# Patient Record
Sex: FEMALE | Race: WHITE | NOT HISPANIC OR LATINO | URBAN - METROPOLITAN AREA
[De-identification: names, ages, dates, MRNs, and addresses within clinical notes are randomized per-mention and may not be internally consistent; named-entity substitution may affect disease eponyms.]

---

## 2017-05-03 ENCOUNTER — OUTPATIENT (OUTPATIENT)
Dept: OUTPATIENT SERVICES | Facility: HOSPITAL | Age: 52
LOS: 1 days | Discharge: HOME | End: 2017-05-03

## 2017-06-28 DIAGNOSIS — F19.20 OTHER PSYCHOACTIVE SUBSTANCE DEPENDENCE, UNCOMPLICATED: ICD-10-CM

## 2018-10-30 ENCOUNTER — OUTPATIENT (OUTPATIENT)
Dept: OUTPATIENT SERVICES | Facility: HOSPITAL | Age: 53
LOS: 1 days | Discharge: HOME | End: 2018-10-30

## 2018-10-30 DIAGNOSIS — Z12.31 ENCOUNTER FOR SCREENING MAMMOGRAM FOR MALIGNANT NEOPLASM OF BREAST: ICD-10-CM

## 2019-01-17 ENCOUNTER — EMERGENCY (EMERGENCY)
Facility: HOSPITAL | Age: 54
LOS: 0 days | Discharge: HOME | End: 2019-01-17
Admitting: PHYSICIAN ASSISTANT

## 2019-01-17 VITALS
HEART RATE: 98 BPM | TEMPERATURE: 98 F | RESPIRATION RATE: 18 BRPM | DIASTOLIC BLOOD PRESSURE: 60 MMHG | SYSTOLIC BLOOD PRESSURE: 110 MMHG | OXYGEN SATURATION: 98 %
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
## 2019-01-17 DIAGNOSIS — M25.461 EFFUSION, RIGHT KNEE: ICD-10-CM

## 2019-01-17 DIAGNOSIS — M25.561 PAIN IN RIGHT KNEE: ICD-10-CM
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
## 2019-01-17 NOTE — ED PROVIDER NOTE - OBJECTIVE STATEMENT
53 year old female w no significant pmhx presents to the ED with right knee pain since yesterday. Patient was walking when she twisted her knee and felt and heard a loud pop. States she has been able to walk but with a limp. Symptoms are mildly relieved with Ibuprofen and icing the knee. Denies falling to the ground or hitting her head. Denies previous injury or surgeries to her knee. Denies numbness, paresthesias, or weakness to her lower extremity. No recent illness.

## 2019-01-17 NOTE — ED PROVIDER NOTE - NSFOLLOWUPINSTRUCTIONS_ED_ALL_ED_FT
[facility]  Address: [address] [place] [address]  Phone: [phone]      Strain    A strain is a stretch or tear in one of the muscles in your body. This is caused by an injury to the area such as a twisting mechanism. Symptoms include pain, swelling, or bruising. Rest that area over the next several days and slowly resume activity when tolerated. Ice can help with swelling and pain.     SEEK IMMEDIATE MEDICAL CARE IF YOU HAVE ANY OF THE FOLLOWING SYMPTOMS: worsening pain, inability to move that body part, numbness or tingling.      Sprain    A sprain is a stretch or tear in one of the tough, fiber-like tissues (ligaments) in your body. This is caused by an injury to the area such as a twisting mechanism. Symptoms include pain, swelling, or bruising. Rest that area over the next several days and slowly resume activity when tolerated. Ice can help with swelling and pain.     SEEK IMMEDIATE MEDICAL CARE IF YOU HAVE ANY OF THE FOLLOWING SYMPTOMS: worsening pain, inability to move that body part, numbness or tingling.

## 2019-01-17 NOTE — ED PROVIDER NOTE - CARE PROVIDER_API CALL
Johan Ford (MD), Orthopaedic Surgery  [facility] [place] [address]  Phone: [phone]  Fax: [phone]

## 2019-01-17 NOTE — ED PROVIDER NOTE - PHYSICAL EXAMINATION
VITALS:  I have reviewed the initial vital signs.  GENERAL: Well-developed, well-nourished, in no acute distress.  HEENT: Sclera clear. Mucous membranes moist.  CARDIOVASCULAR: RRR, nl S1 and S2. No murmurs, rubs, or gallops. 2+ DP pulses b/l.   RESPIRATORY: Normal effort. CTA b/l without wheezes, rales, or rhonchi.  MSK: +swelling and ttp inferior to right patella, overlying the patellar tendon. decreased ROM w extension of the knee, intact flexion. No deformity. No medial or lateral joint line tenderness. No joint laxity. No patellar ttp. Popliteal fossa, calf, and ankle nontender and w/o swelling.   INTEGUMENTARY: Warm, dry. No pallor or rashes. Capillary refill <2 seconds.  NEURO: A&Ox3. Speech clear. Sensation to lower extremities intact and equal throughout.

## 2019-01-17 NOTE — ED PROVIDER NOTE - PROGRESS NOTE DETAILS
patient seen and evaluated by me. knee xrays ordered. patient denies pain medications at this time. discussed xray results w patient, placed in knee immobilizer and bird dressing (see procedure note). will give f/u with ortho. patient understands return precautions.

## 2019-01-17 NOTE — ED PROVIDER NOTE - NS ED ROS FT
CONSTITUTIONAL: (-) fevers, (-) chills  ENT: (-) congestion, (-) rhinorrhea, (-) sore throat  NECK: (-) neck pain, (-) neck stiffness  RESPIRATORY: (-) cough, (-) sputum  GI: (-) nausea, (-) vomiting  MSK: see HPI (+) right knee pain and swelling, (-) ankle pain, (-) back pain  INTEGUMENTARY: (-) rashes, (-) wounds, (-) pallor, (-) ecchymosis  NEURO: (-) paresthesias, (-) numbness, (-) weakness

## 2019-06-28 ENCOUNTER — EMERGENCY (EMERGENCY)
Facility: HOSPITAL | Age: 54
LOS: 0 days | Discharge: HOME | End: 2019-06-28
Admitting: STUDENT IN AN ORGANIZED HEALTH CARE EDUCATION/TRAINING PROGRAM
Payer: MEDICARE

## 2019-06-28 VITALS
DIASTOLIC BLOOD PRESSURE: 92 MMHG | HEART RATE: 107 BPM | RESPIRATION RATE: 18 BRPM | SYSTOLIC BLOOD PRESSURE: 138 MMHG | OXYGEN SATURATION: 98 % | TEMPERATURE: 98 F

## 2019-06-28 DIAGNOSIS — M79.89 OTHER SPECIFIED SOFT TISSUE DISORDERS: ICD-10-CM

## 2019-06-28 DIAGNOSIS — M79.661 PAIN IN RIGHT LOWER LEG: ICD-10-CM

## 2019-06-28 DIAGNOSIS — M25.569 PAIN IN UNSPECIFIED KNEE: ICD-10-CM

## 2019-06-28 DIAGNOSIS — F17.200 NICOTINE DEPENDENCE, UNSPECIFIED, UNCOMPLICATED: ICD-10-CM

## 2019-06-28 PROCEDURE — 93970 EXTREMITY STUDY: CPT | Mod: 26

## 2019-06-28 PROCEDURE — 99284 EMERGENCY DEPT VISIT MOD MDM: CPT

## 2019-06-28 NOTE — ED PROVIDER NOTE - OBJECTIVE STATEMENT
54y F pmh bakers cysts presents for evaluation of R leg swelling. Pt states she has been having mild/moderate aching R LE pain, worse with ambulations, relieved with compression and elevation. Associated swelling of R LE. Pt states she has had swelling in the past related to her R sided bakers cyst but it has not been painful in the past. Denies weakness, numbness, sob, cp, lightheadness 54y F pmh bakers cysts presents for evaluation of R leg swelling. Pt states she has been having mild/moderate aching R LE pain, worse with ambulations, relieved with compression and elevation. Associated swelling of R LE. Pt states she has had swelling in the past related to her R sided bakers cyst but it has not been painful in the past. Denies weakness, numbness, sob, cp, lightheadedness

## 2019-06-28 NOTE — ED PROVIDER NOTE - CARE PROVIDER_API CALL
Walt Santos)  Surgery; Vascular Surgery  [address] [place] [address]  Phone: [phone]  Fax: [phone]  Follow Up Time:

## 2019-06-28 NOTE — ED PROVIDER NOTE - NS ED ROS FT
Constitutional: (-) fever  Eyes/ENT: (-) blurry vision, (-) epistaxis  Cardiovascular: (-) chest pain, (-) syncope  Respiratory: (-) cough, (-) shortness of breath  Gastrointestinal: (-) vomiting, (-) diarrhea  : (-) dysuria, (-) hematuria  Musculoskeletal: (-) neck pain, (-) back pain, (-) joint pain  Integumentary: (+) edema, (-) rash  Neurological: (-) headache, (-) altered mental status  Allergic/Immunologic: (-) pruritus

## 2019-06-28 NOTE — ED ADULT NURSE NOTE - NSIMPLEMENTINTERV_GEN_ALL_ED
Implemented All Universal Safety Interventions:  [place] to call system. Call bell, personal items and telephone within reach. Instruct patient to call for assistance. Room bathroom lighting operational. Non-slip footwear when patient is off stretcher. Physically safe environment: no spills, clutter or unnecessary equipment. Stretcher in lowest position, wheels locked, appropriate side rails in place.

## 2019-06-28 NOTE — ED PROVIDER NOTE - PHYSICAL EXAMINATION
CONST: Well appearing in NAD  CARD: Normal S1 S2; Normal rate and rhythm  RESP: Equal BS B/L, No wheezes, rhonchi or rales. No distress  GI: Soft, non-tender, non-distended. no cva tenderness. normal BS  MS: Normal ROM in all extremities. No midline spinal tenderness. pulses 2 +. mild right calf swelling  SKIN: Warm, dry, no acute rashes. Good turgor  NEURO: Strength 5/5 with no sensory deficits. Steady gait.

## 2019-06-28 NOTE — ED PROVIDER NOTE - NSFOLLOWUPINSTRUCTIONS_ED_ALL_ED_FT
Follow up with vascular surgery and PMD in 1-2 days.    WHAT YOU NEED TO KNOW:    Leg edema is swelling caused by fluid buildup. Your legs may swell if you sit or stand for long periods of time, are pregnant, or are injured. Swelling may also occur if you have heart failure or circulation problems. This means that your heart does not pump blood through your body as it should.    DISCHARGE INSTRUCTIONS:    Self-care:     Elevate your legs: Raise your legs above the level of your heart as often as you can. This will help decrease swelling and pain. Prop your legs on pillows or blankets to keep them elevated comfortably.      Wear pressure stockings: These tight stockings put pressure on your legs to promote blood flow and prevent blood clots. Wear the stockings during the day. Do not wear them while you sleep.      Apply heat: Heat helps decrease pain and swelling. Apply heat on the area for 20 to 30 minutes every 2 hours for as many days as directed.       Stay active: Do not stand or sit for long periods of time. Ask your healthcare provider about the best exercise plan for you.      Eat healthy foods: Healthy foods include fruits, vegetables, whole-grain breads, low-fat dairy products, beans, lean meats, and fish. Ask if you need to be on a special diet. Limit salt. Salt will make your body hold even more fluid.    Follow up with your healthcare provider as directed: Write down your questions so you remember to ask them during your visits.     Contact your healthcare provider if:     You have a fever or feel more tired than usual.      The veins in your legs look larger than usual. They may look full or bulging.      Your legs itch or feel heavy.      You have red or white areas or sores on your legs. The skin may also appear dimpled or have indentations.      You are gaining weight.      You have trouble moving your ankles.      The swelling does not go away, or other parts of your body swell.      You have questions or concerns about your condition or care.    Return to the emergency department if:     You cannot walk.      You feel faint or confused.       Your skin turns blue or gray.      Your leg feels warm, tender, and painful. It may be swollen and red.      You have chest pain or trouble breathing that is worse when you lie down.      You suddenly feel lightheaded and have trouble breathing.      You have new and sudden chest pain. You may have more pain when you take deep breaths or cough. You may also cough up blood.

## 2019-07-01 ENCOUNTER — INBOUND DOCUMENT (OUTPATIENT)
Age: 54
End: 2019-07-01

## 2019-07-01 PROBLEM — Z00.00 ENCOUNTER FOR PREVENTIVE HEALTH EXAMINATION: Status: ACTIVE | Noted: 2019-07-01

## 2019-08-06 ENCOUNTER — APPOINTMENT (OUTPATIENT)
Dept: PLASTIC SURGERY | Facility: CLINIC | Age: 54
End: 2019-08-06

## 2019-12-24 ENCOUNTER — OUTPATIENT (OUTPATIENT)
Dept: OUTPATIENT SERVICES | Facility: HOSPITAL | Age: 54
LOS: 1 days | Discharge: HOME | End: 2019-12-24
Payer: MEDICARE

## 2019-12-24 DIAGNOSIS — N64.4 MASTODYNIA: ICD-10-CM

## 2019-12-24 DIAGNOSIS — Z98.82 BREAST IMPLANT STATUS: ICD-10-CM

## 2019-12-24 PROCEDURE — 77066 DX MAMMO INCL CAD BI: CPT | Mod: 26

## 2019-12-24 PROCEDURE — 76641 ULTRASOUND BREAST COMPLETE: CPT | Mod: 26,50

## 2019-12-24 PROCEDURE — G0279: CPT | Mod: 26

## 2020-09-23 ENCOUNTER — OUTPATIENT (OUTPATIENT)
Dept: OUTPATIENT SERVICES | Facility: HOSPITAL | Age: 55
LOS: 1 days | Discharge: HOME | End: 2020-09-23
Payer: MEDICARE

## 2020-09-23 DIAGNOSIS — R07.89 OTHER CHEST PAIN: ICD-10-CM

## 2020-09-23 PROCEDURE — 78452 HT MUSCLE IMAGE SPECT MULT: CPT | Mod: 26

## 2021-05-04 ENCOUNTER — OUTPATIENT (OUTPATIENT)
Dept: OUTPATIENT SERVICES | Facility: HOSPITAL | Age: 56
LOS: 1 days | Discharge: HOME | End: 2021-05-04
Payer: MEDICARE

## 2021-05-04 DIAGNOSIS — N63.20 UNSPECIFIED LUMP IN THE LEFT BREAST, UNSPECIFIED QUADRANT: ICD-10-CM

## 2021-05-04 PROCEDURE — 77067 SCR MAMMO BI INCL CAD: CPT | Mod: 26

## 2021-05-04 PROCEDURE — 77063 BREAST TOMOSYNTHESIS BI: CPT | Mod: 26

## 2021-05-04 PROCEDURE — 76641 ULTRASOUND BREAST COMPLETE: CPT | Mod: 26,50

## 2021-08-27 ENCOUNTER — EMERGENCY (EMERGENCY)
Facility: HOSPITAL | Age: 56
LOS: 0 days | Discharge: HOME | End: 2021-08-27
Attending: EMERGENCY MEDICINE | Admitting: EMERGENCY MEDICINE
Payer: MEDICARE

## 2021-08-27 VITALS
HEART RATE: 73 BPM | SYSTOLIC BLOOD PRESSURE: 145 MMHG | DIASTOLIC BLOOD PRESSURE: 67 MMHG | WEIGHT: 169.98 LBS | OXYGEN SATURATION: 99 % | TEMPERATURE: 99 F | RESPIRATION RATE: 18 BRPM

## 2021-08-27 DIAGNOSIS — R22.0 LOCALIZED SWELLING, MASS AND LUMP, HEAD: ICD-10-CM

## 2021-08-27 DIAGNOSIS — K08.89 OTHER SPECIFIED DISORDERS OF TEETH AND SUPPORTING STRUCTURES: ICD-10-CM

## 2021-08-27 PROCEDURE — 99283 EMERGENCY DEPT VISIT LOW MDM: CPT

## 2021-08-27 RX ORDER — KETOROLAC TROMETHAMINE 30 MG/ML
30 SYRINGE (ML) INJECTION ONCE
Refills: 0 | Status: DISCONTINUED | OUTPATIENT
Start: 2021-08-27 | End: 2021-08-27

## 2021-08-27 RX ORDER — KETOROLAC TROMETHAMINE 30 MG/ML
15 SYRINGE (ML) INJECTION ONCE
Refills: 0 | Status: DISCONTINUED | OUTPATIENT
Start: 2021-08-27 | End: 2021-08-27

## 2021-08-27 RX ORDER — KETOROLAC TROMETHAMINE 30 MG/ML
1 SYRINGE (ML) INJECTION
Qty: 7 | Refills: 0
Start: 2021-08-27 | End: 2021-09-02

## 2021-08-27 RX ADMIN — Medication 30 MILLIGRAM(S): at 16:29

## 2021-08-27 NOTE — ED PROVIDER NOTE - PHYSICAL EXAMINATION
Physical Exam    Vital Signs: I have reviewed the initial vital signs.  Constitutional: well-nourished, appears stated age, no acute distress  Eyes: Conjunctiva pink, Sclera clear, PERRLA, EOMI, no ptosis, no entrapment, no racoon eyes.  mouth: ttp alogn tooth 27, no hyperemia of gums ,visible abscess or drainage, no sublingual edemaor ttp, no sub mandibular edema or ttp, uvual midline without tonsillar exudates no areas of fluctuanceor induration.  Cardiovascular: S1 and S2, regular rate, regular rhythm, well-perfused extremities, radial pulses equal and 2+, calves nonttp, equal in size  Respiratory: unlabored respiratory effort, speaking in full sentences, handling oral secretions,     Musculoskeletal: supple neck, to tmj tenderness  Integumentary: warm, dry, no rashes, lacerations,  Neurologic: awake, alert,

## 2021-08-27 NOTE — ED PROVIDER NOTE - ATTENDING CONTRIBUTION TO CARE
57 yo F no pmh presents with dental pain. Patient states that she had a dental implant placed 1 year ago. Few weeks ago started to have pain and swelling to the right lower molar. Went to the dentist yesterday and was found to have a dental abscess. Was started on clindamycin 2 days ago but dose increased yesterday. no fevers. + facial swelling. no difficulty eating.     VITALS:  I have reviewed the initial vital signs.  GENERAL: Well-developed, well-nourished, in no acute distress. Nontoxic.  HEENT: MMM, tolerating oral secretions. No trismus. + tenderness to right lower molar, No abscess. No floor of mouth or submandibular swelling. No tongue elevation.  + right lower facial swelling.   NECK: supple w FROM.   PULM: Normal effort. No tachypnea or retractions. No stridor.   SKIN: Warm, dry.  NEURO: A&Ox3. Speech clear. CN II-XII intact. No focal deficits.

## 2021-08-27 NOTE — ED PROVIDER NOTE - NSFOLLOWUPINSTRUCTIONS_ED_ALL_ED_FT
Make an appointment with the dentist. Take your clinda as prescribed. Start your augmentin tomorrow. Return to the ER if your pain worsens or you develop fever or difficulty swallowing. Do not take additional motrin or advil while taking the toradol you were prescribed,     Dental Pain    Dental pain (toothache) may be caused by many things including tooth decay (cavities or caries), abscess or infection, injury, or the reason may be unknown. Your pain may only occur when you are chewing, are exposed to hot or cold temperature, are eating or drinking sugary foods or beverages, or your pain may be constant. If you were prescribed an antibiotic medicine, finish all of it even if you start to feel better. Rinsing your mouth with salt water or applying ice to the painful area of your face may help with the pain.    SEEK IMMEDIATE MEDICAL CARE IF YOU HAVE THE FOLLOWING SYMPTOMS: unable to open mouth, trouble breathing or swallowing, fever, or swelling of the face, neck or jaw.

## 2021-08-27 NOTE — ED PROVIDER NOTE - NS ED ROS FT
Constitutional: (-) fever (-) chills (-) (-) lightheadedness   Eyes/ENT: (-) blurry vision, (-) epistaxis (-) rhinorrhea (-) nasal congestion (+) dental pain  Cardiovascular: (-) chest pain, (-) syncope (-) palpitations   Respiratory: (-) cough, (-) shortness of breath (-) pleurisy   Gastrointestinal: (-) vomiting, (-) diarrhea (-) abdominal pain (-) nausea (-) anorexia  Musculoskeletal: (-) neck pain, (-) back pain, (-) joint pain (-) joint swelling (-) painful ROM  Integumentary: (-) rash, (-) edema (-) lacerations (-) pruritis   Neurological: (-) headache, (-) altered mental status (-) LOC (-) dizziness (-) paresthesias (-) gait abnormalities

## 2021-08-27 NOTE — ED ADULT TRIAGE NOTE - CHIEF COMPLAINT QUOTE
had dental work 3 weeks ago, , right lower tooth cap, became swollen and red, took clinda and got better, now worse again

## 2021-08-27 NOTE — ED PROVIDER NOTE - CLINICAL SUMMARY MEDICAL DECISION MAKING FREE TEXT BOX
Patient presents with dental pain. On clinda for 2 days, dose increased yesterday. no fevers. + facial swelling on exam. Tolerating secretions. new abx sent. Discharged with dental follow up and return precautions.

## 2021-08-27 NOTE — ED PROVIDER NOTE - NSFOLLOWUPCLINICS_GEN_ALL_ED_FT
[facility]  Dental  [address] [place] [id number]  Phone: [phone]  Fax:

## 2021-08-27 NOTE — ED PROVIDER NOTE - PROGRESS NOTE DETAILS
dc: vss, ttp along tooth 27. Continue abx and rx sent for augmentin will begin tomorrow. Monitor for fever , chills, n/v or other symptoms. F/u given to dental clinic.

## 2021-08-27 NOTE — ED PROVIDER NOTE - PATIENT PORTAL LINK FT
You can access the FollowMyHealth Patient Portal offered by [facility] by registering at the following website: [email or web address]. By joining [facility]’s FollowMyHealth portal, you will also be able to view your health information using other applications (apps) compatible with our system.

## 2021-08-27 NOTE — ED PROVIDER NOTE - OBJECTIVE STATEMENT
56 y.o. woman no pmh, psh of dental cap took # 27 1 year ago presenting to ER for 2 days of dental pain and slight tenderness to cheek. Is on two days of clindaymycin rx sent by dentist. Was seen in office 2 days ago, xrays showed small abscess. No fever , chills ,sor ehtroat, voice chanhges, difficulty swallowing or handlign orall secretions. no sob or cough, cp or palpitations, n/v, neck pain

## 2022-09-16 ENCOUNTER — OUTPATIENT (OUTPATIENT)
Dept: OUTPATIENT SERVICES | Facility: HOSPITAL | Age: 57
LOS: 1 days | Discharge: HOME | End: 2022-09-16

## 2022-09-16 DIAGNOSIS — R92.8 OTHER ABNORMAL AND INCONCLUSIVE FINDINGS ON DIAGNOSTIC IMAGING OF BREAST: ICD-10-CM

## 2022-09-16 PROCEDURE — 77066 DX MAMMO INCL CAD BI: CPT | Mod: 26

## 2022-09-16 PROCEDURE — 76642 ULTRASOUND BREAST LIMITED: CPT | Mod: 26,LT

## 2022-09-16 PROCEDURE — G0279: CPT | Mod: 26

## 2022-11-09 ENCOUNTER — APPOINTMENT (OUTPATIENT)
Dept: ORTHOPEDIC SURGERY | Facility: CLINIC | Age: 57
End: 2022-11-09

## 2023-02-23 ENCOUNTER — APPOINTMENT (OUTPATIENT)
Dept: ORTHOPEDIC SURGERY | Facility: CLINIC | Age: 58
End: 2023-02-23

## 2023-03-09 ENCOUNTER — APPOINTMENT (OUTPATIENT)
Dept: NEUROLOGY | Facility: CLINIC | Age: 58
End: 2023-03-09
Payer: MEDICARE

## 2023-03-09 VITALS
HEIGHT: 65 IN | SYSTOLIC BLOOD PRESSURE: 123 MMHG | WEIGHT: 160 LBS | BODY MASS INDEX: 26.66 KG/M2 | HEART RATE: 80 BPM | DIASTOLIC BLOOD PRESSURE: 80 MMHG

## 2023-03-09 DIAGNOSIS — F29 UNSPECIFIED PSYCHOSIS NOT DUE TO A SUBSTANCE OR KNOWN PHYSIOLOGICAL CONDITION: ICD-10-CM

## 2023-03-09 DIAGNOSIS — G43.709 CHRONIC MIGRAINE W/OUT AURA, NOT INTRACTABLE, W/OUT STATUS MIGRAINOSUS: ICD-10-CM

## 2023-03-09 PROCEDURE — 99204 OFFICE O/P NEW MOD 45 MIN: CPT

## 2023-03-14 PROBLEM — G43.709 CHRONIC MIGRAINE WITHOUT AURA: Status: ACTIVE | Noted: 2023-03-14

## 2023-03-14 PROBLEM — F29 PSYCHOSIS: Status: ACTIVE | Noted: 2023-03-09

## 2023-03-14 NOTE — HISTORY OF PRESENT ILLNESS
[FreeTextEntry1] : Ms. Barry is a 57 year old woman who comes in for Memory Issues. She had an episode of Psychosis in 2019. \par Patients mother had brain cancer and she wants to have A neurological work up done. Pain reports of constant chronic pain. Patient has broken sleep throughout the night. She follows up with a Psychiatrist. She denies snoring in her sleep. She has been checked for Sleep Apnea and indicated that she does not have Sleep Apnea. She went in for a half a pack of cigarettes a day. Her pain level is a 7 out of 10. Patient denies of having any pain. \par In 2020 she followed up with \par \par Normal Neurological Exam. - Indicates slight findings of Sleep Apnea.

## 2023-03-14 NOTE — ASSESSMENT
[FreeTextEntry1] : cognitive impairment/history of psychosis\par \par -will obtain previous done neuroimaging for review\par - Neuro Psychological Testing. \par - EEG. \par - Blood Work.\par \par \par chronic migraine headache\par -sumatriptan for abortive therapy\par \par \par \par \par \par I, Briana Gavin, Attest that this documentation has been prepared under the direction and in the presence of Provider Ronnie Mosher DO\par \par Thank You for letting me assist in the management of this patient. \par \par Ronnie Mosher DO\par Board Certified, Neurology\par  \par

## 2023-03-31 ENCOUNTER — APPOINTMENT (OUTPATIENT)
Dept: NEUROLOGY | Facility: CLINIC | Age: 58
End: 2023-03-31

## 2023-04-27 ENCOUNTER — APPOINTMENT (OUTPATIENT)
Dept: NEUROLOGY | Facility: CLINIC | Age: 58
End: 2023-04-27

## 2023-09-01 ENCOUNTER — APPOINTMENT (OUTPATIENT)
Dept: NEUROSURGERY | Facility: CLINIC | Age: 58
End: 2023-09-01
Payer: MEDICARE

## 2023-09-01 VITALS — HEIGHT: 65 IN | BODY MASS INDEX: 26.16 KG/M2 | WEIGHT: 157 LBS

## 2023-09-01 PROCEDURE — 99204 OFFICE O/P NEW MOD 45 MIN: CPT

## 2023-09-01 NOTE — END OF VISIT
[FreeTextEntry3] : I have independently evaluated and examined this patient, as well as independently reviewed her AP/lateral cervical x-ray and agree with above.  Ms. Barry does have degenerative changes at C4-5, the level above her C5-7 ACDF she will proceed with conservative measures.  Follow-up with me in 8 weeks time or sooner should she develop any new signs or symptoms of neurologic dysfunction.  All her questions were answered and she was in full agreement with the plan above.  Thank you for allowing us to participate in your care.  Sincerely,  All Valero MD Department of Neurosurgery [facility] [Time Spent: ___ minutes] : I have spent [unfilled] minutes of time on the encounter.

## 2023-09-01 NOTE — HISTORY OF PRESENT ILLNESS
[FreeTextEntry1] : neck pain  [de-identified] : This is a 58-year-old, RH, female past medical history of a C5-C7 ACDF done by Dr. Klein in 2015 for neck pain which resolved after the surgery presents today reporting of neck pain radiating to both shoulders worse on the left side since she fell down a burrow hole while her pitbull was tugging her, 3 months ago. Reports the pain radiates down the left elbow. Denies radicular right arm pain, numbness, tingling, weakness, bladder/bowel incontinence, and saddle anesthesia.  At this visit, Ms. Barry, does report of left facial numbness, tingling, and weakness. Reports her mother passed away at 62 yrs old from Glioblastoma. She is schedule to see neurology on September 13.  She has not done any conservative management.  PMH: psychosis (family hx  schizophrenia) under the care of a behavioral health provider  On physical examination:  She is awake alert oriented person place and time, her pupils equal round reactive to light, her extraocular muscle intact, her face is symmetric, speech is clear and fluent, her tongue is midline protrusion testing, her has full palate raise. Her is 5 out of 5 in bilateral shoulder shrug.  She Is 5 out of 5 in bilateral upper and bilateral lower extremities throughout  Sensation to light touch intact in all 4 extremities, no saddle anesthesia  No pronator drift  2+ bilateral bicep and patellar deep tendon reflexes  Negative Layne sign bilaterally  Negative ankle clonus bilaterally  Normal tandem nonantalgic gait  Pain is improved with lifting her left arm overhead and with light cervical traction.

## 2023-09-01 NOTE — PLAN
[FreeTextEntry1] : Ms. Barry , h/o  C5-6 ACDF in 2015, presents today for neck pain radiating to the shoulders, worse on the left side, which radiates down to the elbow, after falling in a burrow hole 3 months ago.  No neurological deficit noted on exam.  I am ordering an MRI of the cervical spine without contrast to assess for adjacent segment level disease.  Also ordering a CT of the cervical spine to assess the hardware and bone structures and a cervical flex/ex x-ray of the cervical spine to assess for alignment and instability.  In the meantime she will be referred to physical therapy for 6 to 8 weeks and pain management, for conservative measures.  She will follow-up with me in 8 to 10 weeks.  All questions were answered.  She is agreeable to the plan.

## 2023-09-07 ENCOUNTER — APPOINTMENT (OUTPATIENT)
Dept: PAIN MANAGEMENT | Facility: CLINIC | Age: 58
End: 2023-09-07

## 2023-09-08 ENCOUNTER — APPOINTMENT (OUTPATIENT)
Dept: PAIN MANAGEMENT | Facility: CLINIC | Age: 58
End: 2023-09-08
Payer: MEDICARE

## 2023-09-08 DIAGNOSIS — M25.519 PAIN IN UNSPECIFIED SHOULDER: ICD-10-CM

## 2023-09-08 DIAGNOSIS — M54.2 CERVICALGIA: ICD-10-CM

## 2023-09-08 PROCEDURE — 99203 OFFICE O/P NEW LOW 30 MIN: CPT | Mod: 25

## 2023-09-08 PROCEDURE — 20552 NJX 1/MLT TRIGGER POINT 1/2: CPT

## 2023-09-09 NOTE — PHYSICAL EXAM
[de-identified] : Cervical Spine Exam:  Inspection: erythema (-)  ecchymosis (-)  rashes (-)   Palpation:                                         Cervical paraspinal mm tenderness:   R (-); L(-) Upper trapezius mm tenderness:        R (-); L (-) Rhomboids tenderness:                       R (-); L (-) Scalenes mm tenderness:                   R (-); L (-) Occipital Ridge:                                    R (-); L (-) Supraspinatus mm tenderness:           R (-); L (-)  ROM:   limited rom 2/2 to pain  Strength Testing:            Right    Left Deltoid                             (5/5)    (5/5) Biceps:                            (5/5)    (5/5) Triceps:                           (5/5)    (5/5) Finger Abductors:           (5/5)    (5/5) Grasp:                             (5/5)    (5/5)  Special Testing: Spurling Test:                  R (-); L (-) Facet load test:               R (-); L (-)

## 2023-09-09 NOTE — PROCEDURE
[Trigger point 1-2 muscle groups] : trigger point 1-2 muscle groups [Left] : of the left [Cervical paraspinal muscle] : cervical paraspinal muscle [Thoracic paraspinal muscle] : thoracic paraspinal muscle [Pain] : pain [Inflammation] : inflammation [Alcohol] : alcohol [Ethyl Chloride sprayed topically] : ethyl chloride sprayed topically [Sterile technique used] : sterile technique used [___ cc    1%] : Lidocaine ~Vcc of 1%  [___ cc    4mg] : Dexamethasone (Decadron) ~Vcc of 4 mg  [] : Patient tolerated procedure well [Call if redness, pain or fever occur] : call if redness, pain or fever occur [Apply ice for 15min out of every hour for the next 12-24 hours as tolerated] : apply ice for 15 minutes out of every hour for the next 12-24 hours as tolerated [Previous OTC use and PT nontherapeutic] : patient has tried OTC's including aspirin, Ibuprofen, Aleve, etc or prescription NSAIDS, and/or exercises at home and/or physical therapy without satisfactory response [Risks, benefits, alternatives discussed / Verbal consent obtained] : the risks benefits, and alternatives have been discussed, and verbal consent was obtained

## 2023-09-09 NOTE — HISTORY OF PRESENT ILLNESS
[FreeTextEntry1] : HPI: Ms. Barry is a 58-year-old female complaining of neck and shoulder pain. The patient has past medical history spinal fusion on C5-C6 in 2015. The patients pain started 3 months ago after a fall. The patient foot got stuck in a burrow hole in grass and tripped and fell with severe pain that came on right away. The pain is left side of neck with referring pain to left shoulder blade that is associated with pins and needles. The patient reports of pain to the left elbow. As for her right side she denies pain or radiating symptoms. The pain is severe that is constant of the time with symptoms worsening in no typical pattern. The patient admis to weakness in the UE. The patient alleviates the pain with laying down flat until the pain stops. The patient manages pain with Tylenol and Aleve.

## 2023-09-09 NOTE — DISCUSSION/SUMMARY
[de-identified] : A discussion regarding available pain management treatment options occurred with the patient.  These included interventional, rehabilitative, pharmacological, and alternative modalities. We will proceed with the following:  Interventional treatment options: Proceed with cervical TPI in our office today.   Rehabilitative options: - initiate trial of physical therapy - participation in active HEP was discussed I gave the patient a list of home exercises to do for pain reduction and overall improvement in functional status including but not limited to active neck rotation, active neck side bend, neck flexion, neck extension, chin tuck, scalene stretch, isometric neck flexion, isometric neck extension, isometric neck sidebend, headlift/neck curl, headlift/neck sidebend, neck extension on hands and knees, and scapula squeeze.   follow up in 1  month  I Jessica García attest that this documentation has been prepared under the direction and in the presence of provider Dr. Constantino Marc.  The documentation recorded by the scribe in my presence, accurately reflects the service I personally performed, and the decisions made by me with my edits as appropriate.   Constantino Marc, DO

## 2023-09-15 ENCOUNTER — NON-APPOINTMENT (OUTPATIENT)
Age: 58
End: 2023-09-15

## 2023-09-15 ENCOUNTER — APPOINTMENT (OUTPATIENT)
Dept: NEUROLOGY | Facility: CLINIC | Age: 58
End: 2023-09-15
Payer: MEDICARE

## 2023-09-15 VITALS
WEIGHT: 160 LBS | BODY MASS INDEX: 26.66 KG/M2 | OXYGEN SATURATION: 97 % | HEIGHT: 65 IN | TEMPERATURE: 97.6 F | HEART RATE: 100 BPM | SYSTOLIC BLOOD PRESSURE: 110 MMHG | DIASTOLIC BLOOD PRESSURE: 75 MMHG

## 2023-09-15 DIAGNOSIS — R41.89 OTHER SYMPTOMS AND SIGNS INVOLVING COGNITIVE FUNCTIONS AND AWARENESS: ICD-10-CM

## 2023-09-15 PROCEDURE — 99205 OFFICE O/P NEW HI 60 MIN: CPT

## 2023-09-15 RX ORDER — CLONAZEPAM 1 MG/1
1 TABLET ORAL TWICE DAILY
Refills: 0 | Status: ACTIVE | COMMUNITY

## 2023-09-15 RX ORDER — SUMATRIPTAN 100 MG/1
100 TABLET, FILM COATED ORAL
Qty: 30 | Refills: 5 | Status: DISCONTINUED | COMMUNITY
Start: 2023-03-09 | End: 2023-09-15

## 2023-09-15 RX ORDER — LOSARTAN POTASSIUM 100 MG/1
TABLET, FILM COATED ORAL
Refills: 0 | Status: ACTIVE | COMMUNITY

## 2023-09-19 ENCOUNTER — APPOINTMENT (OUTPATIENT)
Dept: NEUROSURGERY | Facility: CLINIC | Age: 58
End: 2023-09-19
Payer: MEDICARE

## 2023-09-19 VITALS — WEIGHT: 160 LBS | HEIGHT: 65 IN | BODY MASS INDEX: 26.66 KG/M2

## 2023-09-19 DIAGNOSIS — Z80.9 FAMILY HISTORY OF MALIGNANT NEOPLASM, UNSPECIFIED: ICD-10-CM

## 2023-09-19 DIAGNOSIS — Z84.89 FAMILY HISTORY OF OTHER SPECIFIED CONDITIONS: ICD-10-CM

## 2023-09-19 DIAGNOSIS — Z83.3 FAMILY HISTORY OF DIABETES MELLITUS: ICD-10-CM

## 2023-09-19 DIAGNOSIS — F17.200 NICOTINE DEPENDENCE, UNSPECIFIED, UNCOMPLICATED: ICD-10-CM

## 2023-09-19 DIAGNOSIS — Z81.8 FAMILY HISTORY OF OTHER MENTAL AND BEHAVIORAL DISORDERS: ICD-10-CM

## 2023-09-19 DIAGNOSIS — Z86.59 PERSONAL HISTORY OF OTHER MENTAL AND BEHAVIORAL DISORDERS: ICD-10-CM

## 2023-09-19 DIAGNOSIS — Z82.61 FAMILY HISTORY OF ARTHRITIS: ICD-10-CM

## 2023-09-19 DIAGNOSIS — M47.812 SPONDYLOSIS W/OUT MYELOPATHY OR RADICULOPATHY, CERVICAL REGION: ICD-10-CM

## 2023-09-19 DIAGNOSIS — Z82.0 FAMILY HISTORY OF EPILEPSY AND OTHER DISEASES OF THE NERVOUS SYSTEM: ICD-10-CM

## 2023-09-19 PROCEDURE — 99215 OFFICE O/P EST HI 40 MIN: CPT

## 2023-09-25 ENCOUNTER — APPOINTMENT (OUTPATIENT)
Dept: PAIN MANAGEMENT | Facility: CLINIC | Age: 58
End: 2023-09-25

## 2024-01-05 NOTE — ED ADULT NURSE NOTE - TEMPLATE LIST FOR HEAD TO TOE ASSESSMENT
ANTICOAGULATION MANAGEMENT     Danette Ramos 87 year old female is on warfarin with supratherapeutic INR result. (Goal INR 2.0-3.0)    Recent labs: (last 7 days)     01/05/24  0747   INR 3.1*       ASSESSMENT     Source(s): Template     Warfarin doses taken: Warfarin taken as instructed  Diet: No new diet changes identified  New illness, injury, or hospitalization: No  Medication/supplement changes: None noted  Signs or symptoms of bleeding or clotting: No  Previous INR: Supratherapeutic  Additional findings: None     PLAN     Recommended plan for no diet, medication or health factor changes affecting INR     Dosing Instructions: decrease your warfarin dose (6.2% change) with next INR in 1 week       Summary  As of 1/5/2024      Full warfarin instructions:  1/5: Hold; Otherwise 3.75 mg every Thu; 2.5 mg all other days   Next INR check:  1/12/2024               Faxed dosing and follow up instructions to [facility]    Orders given to  Homecare nurse/facility to recheck    Education provided: None required    Plan made per [facility] anticoagulation protocol    Margot Duran RN  Anticoagulation Clinic  1/5/2024    _______________________________________________________________________     Anticoagulation Episode Summary       Current INR goal:  2.0-3.0   TTR:  45.1% (8.8 mo)   Target end date:  Indefinite   Send INR reminders to:  [facility] [place]    Indications    Anticoagulation monitoring  INR range 2-3 [Z79.01]  Chronic a-fib (H) [I48.20]             Comments:  Lives at [facility] [phone] fax [phone].             Anticoagulation Care Providers       Provider Role Specialty Phone number    Isabell Bradley MD Referring Family Medicine [phone]              
General

## 2024-02-05 ENCOUNTER — OUTPATIENT (OUTPATIENT)
Dept: OUTPATIENT SERVICES | Facility: HOSPITAL | Age: 59
LOS: 1 days | End: 2024-02-05
Payer: MEDICARE

## 2024-02-05 DIAGNOSIS — N63.20 UNSPECIFIED LUMP IN THE LEFT BREAST, UNSPECIFIED QUADRANT: ICD-10-CM

## 2024-02-05 DIAGNOSIS — Z12.31 ENCOUNTER FOR SCREENING MAMMOGRAM FOR MALIGNANT NEOPLASM OF BREAST: ICD-10-CM

## 2024-02-05 DIAGNOSIS — N64.4 MASTODYNIA: ICD-10-CM

## 2024-02-05 PROCEDURE — G0279: CPT | Mod: 26

## 2024-02-05 PROCEDURE — G0279: CPT

## 2024-02-05 PROCEDURE — 76641 ULTRASOUND BREAST COMPLETE: CPT | Mod: 50

## 2024-02-05 PROCEDURE — 77066 DX MAMMO INCL CAD BI: CPT

## 2024-02-05 PROCEDURE — 77066 DX MAMMO INCL CAD BI: CPT | Mod: 26

## 2024-02-05 PROCEDURE — 76641 ULTRASOUND BREAST COMPLETE: CPT | Mod: 26,50

## 2024-02-06 DIAGNOSIS — N63.20 UNSPECIFIED LUMP IN THE LEFT BREAST, UNSPECIFIED QUADRANT: ICD-10-CM

## 2024-02-06 DIAGNOSIS — N64.4 MASTODYNIA: ICD-10-CM

## 2024-04-19 ENCOUNTER — APPOINTMENT (OUTPATIENT)
Dept: NEUROLOGY | Facility: CLINIC | Age: 59
End: 2024-04-19

## 2024-12-06 ENCOUNTER — APPOINTMENT (OUTPATIENT)
Dept: PAIN MANAGEMENT | Facility: CLINIC | Age: 59
End: 2024-12-06

## 2024-12-06 DIAGNOSIS — G89.29 PAIN IN RIGHT KNEE: ICD-10-CM

## 2024-12-06 DIAGNOSIS — M25.561 PAIN IN RIGHT KNEE: ICD-10-CM

## 2024-12-06 DIAGNOSIS — M25.562 PAIN IN RIGHT KNEE: ICD-10-CM

## 2024-12-06 PROCEDURE — 99214 OFFICE O/P EST MOD 30 MIN: CPT | Mod: 25

## 2024-12-06 PROCEDURE — 20610 DRAIN/INJ JOINT/BURSA W/O US: CPT | Mod: RT

## 2024-12-27 ENCOUNTER — APPOINTMENT (OUTPATIENT)
Dept: PAIN MANAGEMENT | Facility: CLINIC | Age: 59
End: 2024-12-27

## 2025-05-08 ENCOUNTER — EMERGENCY (EMERGENCY)
Facility: HOSPITAL | Age: 60
LOS: 0 days | Discharge: HOME | End: 2025-05-08
Attending: EMERGENCY MEDICINE
Payer: MEDICARE

## 2025-05-08 VITALS
HEIGHT: 65 IN | DIASTOLIC BLOOD PRESSURE: 61 MMHG | OXYGEN SATURATION: 97 % | SYSTOLIC BLOOD PRESSURE: 92 MMHG | WEIGHT: 160.06 LBS | TEMPERATURE: 98 F | HEART RATE: 96 BPM | RESPIRATION RATE: 20 BRPM

## 2025-05-08 DIAGNOSIS — F10.129 ALCOHOL ABUSE WITH INTOXICATION, UNSPECIFIED: ICD-10-CM

## 2025-05-08 DIAGNOSIS — F31.9 BIPOLAR DISORDER, UNSPECIFIED: ICD-10-CM

## 2025-05-08 PROCEDURE — 70450 CT HEAD/BRAIN W/O DYE: CPT | Mod: 26

## 2025-05-08 PROCEDURE — 70450 CT HEAD/BRAIN W/O DYE: CPT

## 2025-05-08 PROCEDURE — 99285 EMERGENCY DEPT VISIT HI MDM: CPT | Mod: 25

## 2025-05-08 PROCEDURE — 99284 EMERGENCY DEPT VISIT MOD MDM: CPT

## 2025-05-08 NOTE — ED ADULT TRIAGE NOTE - CHIEF COMPLAINT QUOTE
Pt BIBA, pt had "too much to drink" approximately a bottle of liquor, and called because she was unsteady at home

## 2025-05-08 NOTE — ED PROVIDER NOTE - PATIENT PORTAL LINK FT
You can access the FollowMyHealth Patient Portal offered by [facility] by registering at the following website: [email or web address]. By joining [employer]’s FollowMyHealth portal, you will also be able to view your health information using other applications (apps) compatible with our system.

## 2025-05-08 NOTE — ED PROVIDER NOTE - OBJECTIVE STATEMENT
59-year-old female with past medical history of bipolar disorder, brain mass lesion recently diagnosed on MRI presenting with alcohol intoxication.  Patient's  at bedside reports that she has recently been depressed about her new diagnosis and has been coping by drinking.  Patient denies any SI/HI or any history of SI/HI, no auditory or visual hallucinations.  Patient reports having about a quarter bottle of vodka today.  Denies any fever, headache, dizziness, lightness, chest pain, shortness of breath, nausea, vomiting abdominal pain, diarrhea, constipation,  symptoms.  Patient states she has never been in with alcohol withdrawal in the past.

## 2025-05-08 NOTE — ED ADULT NURSE NOTE - NSFALLHARMRISKINTERV_ED_ALL_ED
Assistance OOB with selected safe patient handling equipment if applicable/Assistance with ambulation/Communicate risk of Fall with Harm to all staff, patient, and family/Encourage patient to sit up slowly, dangle for a short time, stand at bedside before walking/Monitor gait and stability/Monitor for mental status changes and reorient to person, place, and time, as needed/Move patient closer to nursing station/within visual sight of ED staff/Provide patient with walking aids/Provide visual cue: red socks, yellow wristband, yellow gown, etc/Reinforce activity limits and safety measures with patient and family/Review medications for side effects contributing to fall risk/Toileting schedule using arm’s reach rule for commode and bathroom/Use of alarms - bed, stretcher, chair and/or video monitoring/Bed in lowest position, wheels locked, appropriate side rails in place/Call bell, personal items and telephone in reach/Instruct patient to call for assistance before getting out of bed/chair/stretcher/Non-slip footwear applied when patient is off stretcher/[place] to call system/Physically safe environment - no spills, clutter or unnecessary equipment/Purposeful Proactive Rounding/Room/bathroom lighting operational, light cord in reach

## 2025-05-08 NOTE — ED PROVIDER NOTE - PHYSICAL EXAMINATION
Constitutional: Well developed, well nourished, no acute distress  Head: Normocephalic, Atraumatic  Eyes: PERRLA, EOMI, conjunctiva and sclera WNL  ENT: Moist mucous membranes, no rhinorrhea,    Neck: Supple, Nontender,    Respiratory: Normal chest excursion with respiration; Breath sounds clear and equal B/L; No wheezes, rales, or rhonchi   Cardiovascular: RRR; Normal S1, S2; No murmurs, rubs or gallops   ABD/GI:  ; Nondistended; Nontender; No guarding, rigidity or rebound; No CVA tenderness  EXT/MS: Moving all extremities; Distal pulses 2+ B/L; No peripheral edema  Skin: Normal for age and race; Warm and dry; No rash  Neurologic:  ;  Normal motor and sensory function  Psychiatric: Intoxicated

## 2025-05-08 NOTE — ED PROVIDER NOTE - PROGRESS NOTE DETAILS
Patient reevaluated, he ANO x 3, able to ambulate, had a full meal in the ED,  at bedside.  Informed of CT results has been comfortable taking patient home.  Patient is feeling back to baseline.  Patient is ready for discharge. [facility]

## 2025-05-08 NOTE — ED PROVIDER NOTE - ATTENDING CONTRIBUTION TO CARE
I have personally performed a history and physical exam on this patient and personally directed the management of the patient. Patient is a 59-year-old female presents for evaluation of alcohol intoxication  at bedside who corroborates history states that she has been increasingly depressed about diagnosis of brain mass and has been drinking more however patient denies any suicidal homicidal ideations at this time denies any coingestions currently denies headache visual changes neck pain chest pain shortness of breath diaphoresis nausea vomiting abdominal pain or weakness    On physical exam patient is normocephalic atraumatic pupils equal round react light accommodation extraocular muscles intact oropharynx clear chest clear to station bilaterally abdomen soft nontender nondistended bowel sounds positive patient is able to move all 4 extremities equally denying suicidal homicidal ideations    Assessment plan considering this patient's history we obtained CT head which is negative history patient is not homicidal or suicidal she appears to be remorseful about drinking but no signs of withdrawal at this point she is ANO x 3 tolerating p.o. with a normal neurologic exam improved I will discharge  is present and comfortable with plan of care patient is aware

## 2025-05-08 NOTE — ED PROVIDER NOTE - NSFOLLOWUPINSTRUCTIONS_ED_ALL_ED_FT
Follow-up with PMD within the next week.    Abuse of Alcohol    WHAT YOU NEED TO KNOW:    What is alcohol abuse? Alcohol abuse means you drink more than the recommended daily or weekly limits. You may be drinking alcohol regularly or drinking large amounts in a short period of time (binge drinking). You continue to drink even though it causes legal, work, or relationship problems.    What do I need to know about recommended alcohol limits? One drink is defined as 12 ounces (oz) of beer, 5 oz of wine, or 1.5 oz of liquor such as whiskey.    Men 21 to 64 years should limit alcohol to 2 drinks a day. Do not have more than 4 drinks in 1 day or more than 14 in 1 week.    All women, and men 65 or older should limit alcohol to 1 drink in a day. Do not have more than 3 drinks in 1 day or more than 7 in 1 week. Do not drink alcohol if you are pregnant.  What are the signs and symptoms of alcohol abuse?    Loss of interest in activities, work, and school    Hiding alcohol, or drinking in private    Depression, or guilt about drinking    Constant thoughts about alcohol    Drinking in the morning to relieve the effects of a hangover    Not being able to control the amount you drink    Restlessness, or erratic and violent behavior  What health problems can alcohol abuse cause?    Cancer in your liver, pancreas, stomach, colon, kidney, or breast    Stroke or a heart attack    Liver, kidney, or lung disease    Blackouts, memory loss, brain damage, or dementia    Diabetes, immune system problems, or thiamine (vitamin B1) deficiency    Problems for you and your baby if you drink while pregnant  How is alcohol abuse treated? Treatment can help you understand the reasons you abuse alcohol. Counselors and therapists provide you with support and help you find ways to cope instead of drinking. You may need inpatient treatment to provide a controlled environment. You may need outpatient treatment after your inpatient treatment is complete.    Detoxification (detox) is a program used to flush alcohol from your body. During detox, medicines are given to help prevent withdrawal symptoms when you stop drinking alcohol.    Brief intervention therapy helps you think about your alcohol use differently. A healthcare provider helps you set goals to decrease the amount of alcohol you drink. Therapy may continue after you leave the hospital.    Vitamin supplements such as B1 may be needed. Alcohol can make it hard for your body to absorb enough vitamin B1. You may be given vitamin B1 if you have low levels. It is also given to prevent brain damage from alcohol use.  What can I do to manage my alcohol use?    Work with healthcare providers on goals to drink less. This can help prevent health problems. For example, you may start by planning your weekly alcohol use. It will be easier to have fewer drinks if you plan ahead.    Have food when you drink alcohol. Food will prevent alcohol from getting into your system too quickly. Eat before you have your first alcohol drink.    Time your drinks carefully. Have no more than 1 drink in an hour. Have a liquid such as water, coffee, or a soft drink between alcohol drinks.    Do not drive if you have had alcohol. Plan ahead so you have a safe ride home. Make sure someone who has not been drinking can help you get home safely. Plan to use a taxi or other ride service, if needed.    Do not drink alcohol if you are taking medicine. Alcohol is dangerous when you combine it with certain medicines, such as acetaminophen or blood pressure medicine. Talk to your healthcare provider about all the medicines you currently take.  Where can I find support and more information?    Alcoholics Anonymous  Web Address: http://www.aa.org  Substance Abuse and Mental Health Services Administration (SAMHSA)  [address]  [place] [address]  Web Address: http://www.samhsa.gov or https://dpt2.samhsa.gov/treatment/  Call your local emergency number (911 in the [place]), or have someone call if:    You have sudden chest pain or trouble breathing.    You want to harm yourself or others.    You have a seizure.  When should I seek care immediately?    You cannot stop vomiting or you vomit blood.    When should I call my doctor?    You have hallucinations (you see or hear things that are not real).    You have questions or concerns about your condition or care.  CARE AGREEMENT:    You have the right to help plan your care. Learn about your health condition and how it may be treated. Discuss treatment options with your healthcare providers to decide what care you want to receive. You always have the right to refuse treatment.

## 2025-05-08 NOTE — ED ADULT NURSE NOTE - SUICIDE SCREENING QUESTION 3
After Visit Summary   9/26/2017    Ines Mott    MRN: [id number]           Patient Information     Date Of Birth          1963        Visit Information        Provider Department      9/26/2017 9:45 AM Dustin Fatima MD [facility]        Today's Diagnoses     Chronic bilateral low back pain with right-sided sciatica    -  1    Hypertension goal BP (blood pressure) < 140/80        Chronic pain syndrome          Care Instructions    (M54.41,  G89.29) Chronic bilateral low back pain with right-sided sciatica  (primary encounter diagnosis)  Comment:    Plan: baclofen (LIORESAL) 10 MG tablet             (I10) Hypertension goal BP (blood pressure) < 140/80  Comment:    Plan: oxyCODONE-acetaminophen (PERCOCET) 5-325 MG per        tablet             (G89.4) Chronic pain syndrome  Comment:    Plan: oxyCODONE-acetaminophen (PERCOCET) 5-325 MG per        tablet          Tapering dosage               Follow-ups after your visit        Who to contact     If you have questions or need follow up information about today's clinic visit or your schedule please contact [facility] directly at [phone].  Normal or non-critical lab and imaging results will be communicated to you by MyChart, letter or phone within 4 business days after the clinic has received the results. If you do not hear from us within 7 days, please contact the clinic through [employer] or phone. If you have a critical or abnormal lab result, we will notify you by phone as soon as possible.  Submit refill requests through [employer] or call your pharmacy and they will forward the refill request to us. Please allow 3 business days for your refill to be completed.          Additional Information About Your Visit        MyChart Information     [employer] lets you send messages to your doctor, view your test results, renew your prescriptions, schedule appointments and more.  "To sign up, go to [email or web address] . Click on \"Log in\" on the left side of the screen, which will take you to the Welcome page. Then click on \"Sign up Now\" on the right side of the page.     You will be asked to enter the access code listed below, as well as some personal information. Please follow the directions to create your username and password.     Your access code is: [email or web address]  Expires: 10/2/2017  7:26 PM     Your access code will  in 90 days. If you need help or a new code, please call your [place] clinic or [phone].        Care EveryWhere ID     This is your Care EveryWhere ID. This could be used by other organizations to access your [place] medical records  [id number]        Your Vitals Were     Pulse Temperature Respirations Pulse Oximetry BMI (Body Mass Index)       55 96  F (35.6  C) (Tympanic) 20 99% 40.43 kg/m2        Blood Pressure from Last 3 Encounters:   17 128/78   17 175/87   17 118/76    Weight from Last 3 Encounters:   17 254 lb 4.8 oz (115.3 kg)   17 256 lb (116.1 kg)   17 255 lb (115.7 kg)              Today, you had the following     No orders found for display         Today's Medication Changes          These changes are accurate as of: 17  8:22 PM.  If you have any questions, ask your nurse or doctor.               Start taking these medicines.        Dose/Directions    baclofen 10 MG tablet   Commonly known as:  LIORESAL   Used for:  Chronic bilateral low back pain with right-sided sciatica   Started by:  Dustin Fatima MD        Dose:  10 mg   Take 1 tablet (10 mg) by mouth 3 times daily   Quantity:  90 tablet   Refills:  1            Where to get your medicines      These medications were sent to [facility] [address] [place] [address] [address] [address]     Phone:  [phone]     baclofen 10 MG tablet         Some of these will need a paper prescription " and others can be bought over the counter.  Ask your nurse if you have questions.     Bring a paper prescription for each of these medications     oxyCODONE-acetaminophen 5-325 MG per tablet                Primary Care Provider Office Phone # Fax #    Dustin Rina Fatima [phone] [address]  [facility] [address]        Equal Access to Services     [facility] MIGUEL : Hadii aad ku hadasho Soomaali, waaxda luqadaha, qaybta kaalmada adeegyada, waxay idiin hayaan adeeg kharash la'aan . So [facility] [phone].    ATENCIÓN: Si habla español, tiene a christianson disposición servicios gratuitos de asistencia lingüística. Llame al [phone].    We comply with applicable federal civil rights laws and [place] laws. We do not discriminate on the basis of race, color, national origin, age, disability sex, sexual orientation or gender identity.            Thank you!     Thank you for choosing [facility]  for your care. Our goal is always to provide you with excellent care. Hearing back from our patients is one way we can continue to improve our services. Please take a few minutes to complete the written survey that you may receive in the mail after your visit with us. Thank you!             Your Updated Medication List - Protect others around you: Learn how to safely use, store and throw away your medicines at www.disposemymeds.org.          This list is accurate as of: 9/26/17  8:22 PM.  Always use your most recent med list.                   Brand Name Dispense Instructions for use Diagnosis    * albuterol 108 (90 BASE) MCG/ACT Inhaler    VENTOLIN HFA    54 g    INHALE TWO PUFFS BY MOUTH EVERY 4 HOURS AS NEEDED FOR SHORTNESS OF BREATH/DYSPNEA    Mild intermittent asthma without complication       * albuterol (2.5 MG/3ML) 0.083% neb solution     30 vial    Take 1 vial (2.5 mg) by nebulization every 6 hours as needed for shortness of breath / dyspnea or wheezing     Mild intermittent asthma with acute exacerbation       ASPIRIN NOT PRESCRIBED    INTENTIONAL    1 each    Please choose reason not prescribed, below    Aspirin intolerance       baclofen 10 MG tablet    LIORESAL    90 tablet    Take 1 tablet (10 mg) by mouth 3 times daily    Chronic bilateral low back pain with right-sided sciatica       blood glucose calibration solution    no brand specified    1 each    Use to calibrate blood glucose monitor as directed.    Type 2 diabetes mellitus without complication, without long-term current use of insulin (H)       blood glucose lancets standard    no brand specified    100 each    Use to test blood sugar daily times daily or as directed.    Type 2 diabetes mellitus without complication, without long-term current use of insulin (H)       blood glucose monitoring meter device kit    no brand specified    1 kit    Use to test blood sugar larisa times daily or as directed.    Type 2 diabetes mellitus without complication, without long-term current use of insulin (H)       blood glucose monitoring test strip    no brand specified    100 strip    Use to test blood sugars once times daily or as directed    Type 2 diabetes mellitus without complication, without long-term current use of insulin (H)       cetirizine 10 MG tablet    zyrTEC    30 tablet    Take 1 tablet (10 mg) by mouth every evening    Seasonal allergic rhinitis due to pollen       cyanocobalamin 1000 MCG/ML injection    VITAMIN B12    1 mL    Inject 1 mL (1,000 mcg) Subcutaneous every 30 days    B12 deficiency       EPINEPHrine 0.3 MG/0.3ML injection 2-pack    EPIPEN/ADRENACLICK/or ANY BX GENERIC EQUIV    2 each    Inject 0.3 mLs (0.3 mg) into the muscle once as needed for anaphylaxis    Late effect of other and unspecified external causes       fluticasone 50 MCG/ACT spray    FLONASE    3 Bottle    Spray 1-2 sprays into both nostrils daily    Seasonal allergic rhinitis due to pollen, Chronic pain syndrome        losartan-hydrochlorothiazide 50-12.5 MG per tablet    HYZAAR    90 tablet    Take 1 tablet by mouth daily    Hypertension goal BP (blood pressure) < 140/80       magnesium oxide 400 (241.3 MG) MG tablet    MAG-OX    90 tablet    Take 1 tablet (400 mg) by mouth daily    Cramp of limb       mometasone-formoterol 100-5 MCG/ACT oral inhaler    DULERA    1 Inhaler    Inhale 2 puffs into the lungs 2 times daily    Persistent asthma       montelukast 10 MG tablet    SINGULAIR    30 tablet    Take 1 tablet (10 mg) by mouth At Bedtime    Seasonal allergic rhinitis due to pollen       ondansetron 4 MG ODT tab    ZOFRAN ODT    10 tablet    Take 1 tablet (4 mg) by mouth every 8 hours as needed for nausea    Chronic pain syndrome       order for DME     1 Device    Right wrist splint for carpal tunnel syndrome  One* Use as directed at hour of sleep    Carpal tunnel syndrome of right wrist       oxyCODONE-acetaminophen 5-325 MG per tablet    PERCOCET    40 tablet    Take 1 tablet by mouth every 4 hours as needed for moderate to severe pain    Hypertension goal BP (blood pressure) < 140/80, Chronic pain syndrome       polyethylene glycol powder    MIRALAX    510 g    Take 17 g (1 capful) by mouth daily    Slow transit constipation       potassium chloride 10 MEQ tablet    K-TAB,KLOR-CON    120 tablet    Take 1 tablet (10 mEq) by mouth daily    Hypokalemia       sertraline 100 MG tablet    ZOLOFT    30 tablet    Take 1 tablet (100 mg) by mouth daily    Recurrent major depression in partial remission (H), Other constipation       sodium chloride 0.65 % nasal spray    SALINE MIST    1 Bottle    Spray 1 spray into both nostrils daily as needed for congestion    Seasonal allergic rhinitis due to pollen       STATIN NOT PRESCRIBED (INTENTIONAL)     0 each    1 each daily Statin not prescribed intentionally due to Active liver disease (liver failure, cirrhosis, hepatitis) LIVER METS    Hyperlipidemia LDL goal <100, Type 2 diabetes  mellitus without complication (H)       triamcinolone 0.1 % paste    KENALOG    5 g    Take by mouth 2 times daily    Oral aphthae       Vitamin D (Cholecalciferol) 1000 UNITS Tabs     60 tablet    Take 2,000 Units by mouth daily    Vitamin D deficiency       * Notice:  This list has 2 medication(s) that are the same as other medications prescribed for you. Read the directions carefully, and ask your doctor or other care provider to review them with you.       No

## 2025-05-08 NOTE — ED PROVIDER NOTE - CLINICAL SUMMARY MEDICAL DECISION MAKING FREE TEXT BOX
Patient is a 59-year-old female presents for evaluation of alcohol intoxication  at bedside who corroborates history states that she has been increasingly depressed about diagnosis of brain mass and has been drinking more however patient denies any suicidal homicidal ideations at this time denies any coingestions currently denies headache visual changes neck pain chest pain shortness of breath diaphoresis nausea vomiting abdominal pain or weakness    On physical exam patient is normocephalic atraumatic pupils equal round react light accommodation extraocular muscles intact oropharynx clear chest clear to station bilaterally abdomen soft nontender nondistended bowel sounds positive patient is able to move all 4 extremities equally denying suicidal homicidal ideations    Assessment plan considering this patient's history we obtained CT head which is negative history patient is not homicidal or suicidal she appears to be remorseful about drinking but no signs of withdrawal at this point she is ANO x 3 tolerating p.o. with a normal neurologic exam improved I will discharge  is present and comfortable with plan of care patient is aware